# Patient Record
Sex: MALE | Race: WHITE | Employment: FULL TIME | URBAN - METROPOLITAN AREA
[De-identification: names, ages, dates, MRNs, and addresses within clinical notes are randomized per-mention and may not be internally consistent; named-entity substitution may affect disease eponyms.]

---

## 2017-03-15 ENCOUNTER — TELEPHONE (OUTPATIENT)
Dept: INTERNAL MEDICINE CLINIC | Facility: CLINIC | Age: 57
End: 2017-03-15

## 2017-03-15 RX ORDER — DEXLANSOPRAZOLE 60 MG/1
CAPSULE, DELAYED RELEASE ORAL
Qty: 90 CAPSULE | Refills: 3 | Status: SHIPPED
Start: 2017-03-15 | End: 2017-12-06

## 2017-08-29 ENCOUNTER — TELEPHONE (OUTPATIENT)
Dept: OTHER | Age: 57
End: 2017-08-29

## 2017-08-29 NOTE — TELEPHONE ENCOUNTER
Dr Gary Garcia: do you approve our office to submit a Prior Auth for patient to receive sumatriptan succinate 100mg #27. Rx is active on file. Patient has new insurance. In the past he has had PA done through a different plan.   Patient has enough for his cluster

## 2017-08-30 NOTE — TELEPHONE ENCOUNTER
PA for Sumatriptan succinate 100 mg tab completed with [facility] via CMM response time 24-72 hours KEY [id number].

## 2017-08-31 RX ORDER — SUMATRIPTAN 100 MG/1
TABLET, FILM COATED ORAL
Qty: 9 TABLET | Refills: 0 | Status: SHIPPED | OUTPATIENT
Start: 2017-08-31 | End: 2017-09-13

## 2017-08-31 NOTE — TELEPHONE ENCOUNTER
Pt's wife Tirso Doyle called to see if we can send  a short supply of Sumatriptan  Succinate #9 to local pharmacy. [facility] mail order has not filled Rx and pt needs it asap. Jose Alejandro Miller can be reached on cell [phone] or home # [phone]

## 2017-09-05 NOTE — TELEPHONE ENCOUNTER
Fax received from [facility] rx. Pa not required for this medication. Refill too soon. Contacted megan vázquez has picked up the medication.

## 2017-09-06 ENCOUNTER — TELEPHONE (OUTPATIENT)
Dept: OTHER | Age: 57
End: 2017-09-06

## 2017-09-06 NOTE — TELEPHONE ENCOUNTER
Please write a letter of medical necessity for the quantity limit and I will fax to the insurance plan. Please include diagnosis, any medications tried and failed, effective treatment response with current medication, etc..

## 2017-09-06 NOTE — TELEPHONE ENCOUNTER
Spoke to pt wife who states prior auth for increase in quantity of Sumatriptan was denies and needs to be appealed. Please call pt wife Maikol Bennett to update on status.

## 2017-09-07 NOTE — TELEPHONE ENCOUNTER
Please generate a letter stating that the patient has a history of cluster headaches. He used to take Sansert. When sumatriptan became available he found this very useful. He has been using it ever since. Is much safer than Sansert.

## 2017-09-11 NOTE — TELEPHONE ENCOUNTER
Patient's wife states she talked to [facility] this morning and they claimed they have not received the appeals letter.   Advised wife that per chart, the letter was faxed Friday afternoon to [phone], asked wife to check with [facility] to verify correct fax

## 2017-09-11 NOTE — TELEPHONE ENCOUNTER
Insurance company stating they could get 9 pills in a month but patient wants 27 pills in a month, and letter from MD office to insurance company sent on sept 8 for the appeal did not specify why they are appealing the quantity of the drug so they need a n

## 2017-09-12 ENCOUNTER — TELEPHONE (OUTPATIENT)
Dept: OTHER | Age: 57
End: 2017-09-12

## 2017-09-12 NOTE — TELEPHONE ENCOUNTER
Routing to managed care and [facility]. Patient needs appeal Letter of Medical Necessity faxed to [facility] for disp #27 of sumitriptan. See below. Non triage task.

## 2017-09-12 NOTE — TELEPHONE ENCOUNTER
Patient's wife is calling because we need a letter of medical necessity written to state why patient needs 27 pills per month of Sumatriptan. If this is not stated in the letter patient will not get the medication.  Please revise your letter stating why pat

## 2017-09-12 NOTE — TELEPHONE ENCOUNTER
Per Ricardo, [facility] care does not handle medications.

## 2017-09-12 NOTE — TELEPHONE ENCOUNTER
This message was sent to [facility] in error. We do not handles authorizations for meds nor do we do letters of medical necessity. Please reference the thread of communications.      Thank you, Ricardo

## 2017-09-13 ENCOUNTER — TELEPHONE (OUTPATIENT)
Dept: OTHER | Age: 57
End: 2017-09-13

## 2017-09-13 RX ORDER — SUMATRIPTAN 100 MG/1
TABLET, FILM COATED ORAL
Qty: 9 TABLET | Refills: 0 | Status: SHIPPED | OUTPATIENT
Start: 2017-09-13 | End: 2018-08-14

## 2017-09-13 NOTE — TELEPHONE ENCOUNTER
Clinic site staff please inform doctor triage department does not write letters of medical necessity.

## 2017-09-13 NOTE — TELEPHONE ENCOUNTER
Wife called back, she is very frustrated because the appeal for 27 tabs of sumatriptan is still outstanding. Advised her that a prescription for 9 tabs was sent to pharmacy today and will be available for  this afternoon.   She was relieved to hear

## 2017-09-13 NOTE — TELEPHONE ENCOUNTER
Spouse calling stating patient is now down to two pills and needs a new rx sent in to the [facility] for another 9 pills due to cluster headaches.

## 2017-09-13 NOTE — TELEPHONE ENCOUNTER
Called patient informed him that he could pay OOP for the medication if he wants to. Patient will let us know if they are able to use a coupon to get the rx filled.

## 2017-09-13 NOTE — TELEPHONE ENCOUNTER
Spoke with pt wife and Sumatriptan was denied since still only approved for 9 tablets. Pt wife is asking for appeal to include 27 tabs per month.     Pt is currently without medication

## 2017-09-14 NOTE — TELEPHONE ENCOUNTER
Medical necessity letter updated per Dr. Delvin Crawford. Faxed to number listed below. To Whom It May Concern,    Jessica Nolasco is a  Patient under my care. He has a history of cluster headaches for which he used to take Sansert.   When sumatriptan became availa

## 2017-09-22 NOTE — TELEPHONE ENCOUNTER
Call from [address] [place] [address] stating the appeal for the Sumatriptan has been denied due to the plan only covering up to 9 tablets per month; this is an administration policy.

## 2017-11-09 ENCOUNTER — OFFICE VISIT (OUTPATIENT)
Dept: INTERNAL MEDICINE CLINIC | Facility: CLINIC | Age: 57
End: 2017-11-09

## 2017-11-09 VITALS
HEART RATE: 85 BPM | WEIGHT: 213.38 LBS | BODY MASS INDEX: 29 KG/M2 | SYSTOLIC BLOOD PRESSURE: 115 MMHG | DIASTOLIC BLOOD PRESSURE: 78 MMHG

## 2017-11-09 DIAGNOSIS — G44.019 EPISODIC CLUSTER HEADACHE, NOT INTRACTABLE: Primary | ICD-10-CM

## 2017-11-09 PROCEDURE — 99212 OFFICE O/P EST SF 10 MIN: CPT | Performed by: INTERNAL MEDICINE

## 2017-11-09 PROCEDURE — 99213 OFFICE O/P EST LOW 20 MIN: CPT | Performed by: INTERNAL MEDICINE

## 2017-11-09 RX ORDER — SUMATRIPTAN 100 MG/1
100 TABLET, FILM COATED ORAL EVERY 2 HOUR PRN
Qty: 9 TABLET | Refills: 10 | Status: SHIPPED | OUTPATIENT
Start: 2017-11-09 | End: 2019-07-05

## 2017-11-09 RX ORDER — RIZATRIPTAN BENZOATE 10 MG/1
10 TABLET, ORALLY DISINTEGRATING ORAL AS NEEDED
Qty: 9 TABLET | Refills: 5 | Status: SHIPPED | OUTPATIENT
Start: 2017-11-09 | End: 2018-08-14 | Stop reason: ALTCHOICE

## 2017-11-09 NOTE — PROGRESS NOTES
HPI:    Patient ID: Marina Joseph is a 61year old male. Headache    This is a chronic problem. The current episode started more than 1 year ago. The problem occurs intermittently (they come in clusters). The problem has been gradually improving.  The tablet Rfl: 5   SUMAtriptan Succinate 100 MG Oral Tab TAKE 1 TABLET BY MOUTH EVERY DAY AS NEEDED Disp: 9 tablet Rfl: 0   Dexlansoprazole (DEXILANT) 60 MG Oral Capsule Delayed Release TAKE 1 CAPSULE DAILY Disp: 90 capsule Rfl: 3     Allergies:No Known Aller attest that this documentation has been prepared under the direction and in the presence of PAWAN Hadley MD.   Electronically Signed: Leda Cabrera, 11/9/2017, 4:26 PM.    IPAWAN MD,  personally performed the services described in this

## 2017-11-13 RX ORDER — SUMATRIPTAN 100 MG/1
TABLET, FILM COATED ORAL
Qty: 27 TABLET | Refills: 0 | Status: SHIPPED | OUTPATIENT
Start: 2017-11-13 | End: 2018-09-06

## 2017-11-14 ENCOUNTER — TELEPHONE (OUTPATIENT)
Dept: INTERNAL MEDICINE CLINIC | Facility: CLINIC | Age: 57
End: 2017-11-14

## 2017-11-14 NOTE — TELEPHONE ENCOUNTER
PA for Rizatriptan Benzoate 10 mg ODT completed with [facility] via CMM response time 24-72 hours KEY [id number].

## 2017-11-17 ENCOUNTER — TELEPHONE (OUTPATIENT)
Dept: INTERNAL MEDICINE CLINIC | Facility: CLINIC | Age: 57
End: 2017-11-17

## 2017-11-17 NOTE — TELEPHONE ENCOUNTER
Pt states his headaches are getting worst and is requesting methylprednisolone. pls advise. Thank you.

## 2017-11-20 ENCOUNTER — TELEPHONE (OUTPATIENT)
Dept: INTERNAL MEDICINE CLINIC | Facility: CLINIC | Age: 57
End: 2017-11-20

## 2017-11-20 DIAGNOSIS — G44.019 EPISODIC CLUSTER HEADACHE, NOT INTRACTABLE: ICD-10-CM

## 2017-11-20 RX ORDER — METHYLPREDNISOLONE 4 MG/1
TABLET ORAL
Qty: 1 KIT | Refills: 0 | Status: SHIPPED | OUTPATIENT
Start: 2017-11-20 | End: 2018-08-14

## 2017-11-20 NOTE — TELEPHONE ENCOUNTER
Signed Prescriptions Disp Refills    methylPREDNISolone (MEDROL) 4 MG Oral Tablet Therapy Pack 1 kit 0      Sig: As directed.         Authorizing Provider: Jolene Babcock

## 2017-11-20 NOTE — TELEPHONE ENCOUNTER
Patient was seen on 11/9 by Dr Corwin Rosenthal for cluster headaches, was prescribed sumatriptan but forgot to ask for a refill of steroid pack. Patient requesting refill sent to pharmacy.     Please advise    Unable to pend refill

## 2017-11-24 NOTE — TELEPHONE ENCOUNTER
Plan states no PA is required medication is covered under the pharmacy benefit plan. Rubi 89 and informed.

## 2018-08-09 ENCOUNTER — HOSPITAL ENCOUNTER (EMERGENCY)
Facility: HOSPITAL | Age: 58
Discharge: HOME | End: 2018-08-09
Attending: EMERGENCY MEDICINE
Payer: OTHER MISCELLANEOUS

## 2018-08-09 ENCOUNTER — HOSPITAL ENCOUNTER (OUTPATIENT)
Age: 58
Discharge: ANOTHER HOSPITAL | End: 2018-08-09
Attending: FAMILY MEDICINE
Payer: OTHER MISCELLANEOUS

## 2018-08-09 ENCOUNTER — APPOINTMENT (OUTPATIENT)
Dept: ULTRASOUND IMAGING | Facility: HOSPITAL | Age: 58
End: 2018-08-09
Attending: EMERGENCY MEDICINE
Payer: OTHER MISCELLANEOUS

## 2018-08-09 VITALS
WEIGHT: 210 LBS | DIASTOLIC BLOOD PRESSURE: 83 MMHG | SYSTOLIC BLOOD PRESSURE: 128 MMHG | OXYGEN SATURATION: 98 % | RESPIRATION RATE: 18 BRPM | TEMPERATURE: 98 F | BODY MASS INDEX: 28 KG/M2 | HEART RATE: 83 BPM

## 2018-08-09 VITALS
WEIGHT: 210 LBS | OXYGEN SATURATION: 94 % | HEART RATE: 84 BPM | BODY MASS INDEX: 28.44 KG/M2 | SYSTOLIC BLOOD PRESSURE: 108 MMHG | DIASTOLIC BLOOD PRESSURE: 73 MMHG | TEMPERATURE: 99 F | RESPIRATION RATE: 16 BRPM | HEIGHT: 72 IN

## 2018-08-09 DIAGNOSIS — N50.89 TESTICULAR SWELLING, LEFT: Primary | ICD-10-CM

## 2018-08-09 DIAGNOSIS — K40.90 LEFT INGUINAL HERNIA: Primary | ICD-10-CM

## 2018-08-09 PROCEDURE — 99284 EMERGENCY DEPT VISIT MOD MDM: CPT

## 2018-08-09 PROCEDURE — 93975 VASCULAR STUDY: CPT | Performed by: EMERGENCY MEDICINE

## 2018-08-09 PROCEDURE — 99213 OFFICE O/P EST LOW 20 MIN: CPT

## 2018-08-09 PROCEDURE — 76870 US EXAM SCROTUM: CPT | Performed by: EMERGENCY MEDICINE

## 2018-08-09 NOTE — ED PROVIDER NOTES
Patient Seen in: [facility] In [address] [place] [address]    History   Patient presents with:  Testicular Swelling    Stated Complaint: LLQ pain, testicle pain    HPI    Pt is a 59 yo who lifted a heavy object yesterday afternoon and has been having [phone]  ------------------------------------------------------------      MDM         Sent to ER for eval worsening left testicular pain and swelling and possible hernia.       Disposition and Plan     Clinical Impression:  Testicular swelling, left  (primary

## 2018-08-09 NOTE — ED INITIAL ASSESSMENT (HPI)
Pt states he was getting ready to unload a dock when he suddenly felt a pulling sensation in his testicles.  Pt states swelling and pain has worsened today

## 2018-08-10 NOTE — ED PROVIDER NOTES
Patient Seen in: [facility] Emergency Department    History   Patient presents with:  Eval-G (gynecologic)    Stated Complaint: sent by [facility] for [facility]    HPI    Patient is a 40-year-old male who states yesterday he was lifting a heavy object an well-nourished. HEENT:   Head: Normocephalic and atraumatic.    Right Ear: External ear normal.   Left Ear: External ear normal.   Nose: Nose normal.   Mouth/Throat: Oropharynx is clear and moist.   Eyes: Conjunctivae and EOM are normal. Pupils are equal, evaluated by general surgeon I encouraged him to use ibuprofen for discomfort.         Disposition and Plan     Clinical Impression:  Left inguinal hernia  (primary encounter diagnosis)    Disposition:  Discharge  8/9/2018  8:30 pm    Follow-up:  Eduardo Fowler

## 2018-08-14 ENCOUNTER — OFFICE VISIT (OUTPATIENT)
Dept: SURGERY | Facility: CLINIC | Age: 58
End: 2018-08-14
Payer: OTHER MISCELLANEOUS

## 2018-08-14 VITALS — BODY MASS INDEX: 28.44 KG/M2 | HEIGHT: 72 IN | WEIGHT: 210 LBS

## 2018-08-14 DIAGNOSIS — R10.32 LEFT GROIN PAIN: Primary | ICD-10-CM

## 2018-08-14 PROCEDURE — 99244 OFF/OP CNSLTJ NEW/EST MOD 40: CPT | Performed by: SURGERY

## 2018-08-14 PROCEDURE — 99212 OFFICE O/P EST SF 10 MIN: CPT | Performed by: SURGERY

## 2018-08-15 NOTE — PROGRESS NOTES
History and Physical      Porter Huston is a 60year old male. HPI   Patient presents with:  Hernia: Pt states injured left groin on Aug. 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   Pt states he went to [facility] clinic in [address] [place] [address] for initia 0.0 oz/week       Comment: Socially  Other Topics            Concern  Caffeine Concern        Yes    Comment:1 cup of coffee, soda        FAMILY HISTORY  Family History   Problem Relation Age of Onset   • Cancer Mother      breast   • Glaucoma Maternal Gra 8/14/2018  Bridget Feldman MD

## 2018-08-28 ENCOUNTER — OFFICE VISIT (OUTPATIENT)
Dept: SURGERY | Facility: CLINIC | Age: 58
End: 2018-08-28
Payer: COMMERCIAL

## 2018-08-28 DIAGNOSIS — R10.32 LEFT GROIN PAIN: Primary | ICD-10-CM

## 2018-08-28 DIAGNOSIS — T14.8XXA MUSCLE STRAIN: ICD-10-CM

## 2018-08-28 PROCEDURE — 99212 OFFICE O/P EST SF 10 MIN: CPT | Performed by: SURGERY

## 2018-08-28 PROCEDURE — 99213 OFFICE O/P EST LOW 20 MIN: CPT | Performed by: SURGERY

## 2018-08-28 NOTE — PROGRESS NOTES
Established Patient Follow-up      8/28/2018    Michelle Wolf 60year old      HPI  Patient presents with:  Groin Pain: Pt here for check up left groin pain. Pt states pain has decreased but .   Pt states motrin helps with back pain but not r

## 2018-09-06 ENCOUNTER — OFFICE VISIT (OUTPATIENT)
Dept: INTERNAL MEDICINE CLINIC | Facility: CLINIC | Age: 58
End: 2018-09-06
Payer: COMMERCIAL

## 2018-09-06 VITALS
HEART RATE: 73 BPM | SYSTOLIC BLOOD PRESSURE: 122 MMHG | DIASTOLIC BLOOD PRESSURE: 81 MMHG | BODY MASS INDEX: 29 KG/M2 | TEMPERATURE: 98 F | WEIGHT: 214.81 LBS

## 2018-09-06 DIAGNOSIS — M54.50 ACUTE BILATERAL LOW BACK PAIN WITHOUT SCIATICA: Primary | ICD-10-CM

## 2018-09-06 LAB
APPEARANCE: YELLOW
MULTISTIX LOT#: NORMAL NUMERIC
PH, URINE: 6 (ref 4.5–8)
SPECIFIC GRAVITY: 1 (ref 1–1.03)
URINE-COLOR: CLEAR
UROBILINOGEN,SEMI-QN: 0.2 MG/DL (ref 0–1.9)

## 2018-09-06 PROCEDURE — 81003 URINALYSIS AUTO W/O SCOPE: CPT | Performed by: INTERNAL MEDICINE

## 2018-09-06 PROCEDURE — 99213 OFFICE O/P EST LOW 20 MIN: CPT | Performed by: INTERNAL MEDICINE

## 2018-09-06 RX ORDER — PANTOPRAZOLE SODIUM 40 MG/1
40 TABLET, DELAYED RELEASE ORAL 2 TIMES DAILY
Qty: 180 TABLET | Refills: 3 | Status: SHIPPED | OUTPATIENT
Start: 2018-09-06 | End: 2019-09-01

## 2018-09-06 RX ORDER — PANTOPRAZOLE SODIUM 40 MG/1
TABLET, DELAYED RELEASE ORAL
COMMUNITY
Start: 2018-08-29 | End: 2019-07-17

## 2018-09-06 NOTE — PROGRESS NOTES
HPI:    Patient ID: Isaias Choi is a 60year old male. Sinusitis   This is a new problem. The current episode started in the past 7 days. The problem is unchanged. There has been no fever.  Pertinent negatives include no chills, congestion, ear pain, Right  2003:  INGUINAL HERNIA REPAIR Left  No date: VASECTOMY  2005: YAG CAPSULOTOMY - OU - BOTH EYES   Family History   Problem Relation Age of Onset   • Cancer Mother      breast   • Glaucoma Maternal Grandfather    • Diabetes Maternal Grandmother    • Ca (primary encounter diagnosis)     (M54.5) Acute bilateral low back pain without sciatica  (primary encounter diagnosis)  Plan: Pt presents to clinic today for bilateral lower back pain which is worse with lying down.  Pt takes ibuprofen and drinks lemonade

## 2018-09-11 ENCOUNTER — OFFICE VISIT (OUTPATIENT)
Dept: SURGERY | Facility: CLINIC | Age: 58
End: 2018-09-11
Payer: OTHER MISCELLANEOUS

## 2018-09-11 DIAGNOSIS — R10.32 LEFT GROIN PAIN: Primary | ICD-10-CM

## 2018-09-11 PROCEDURE — 99212 OFFICE O/P EST SF 10 MIN: CPT | Performed by: SURGERY

## 2018-09-11 PROCEDURE — 99213 OFFICE O/P EST LOW 20 MIN: CPT | Performed by: SURGERY

## 2018-09-12 ENCOUNTER — APPOINTMENT (OUTPATIENT)
Dept: OCCUPATIONAL MEDICINE | Age: 58
End: 2018-09-12
Attending: ORTHOPAEDIC SURGERY

## 2018-09-12 NOTE — PROGRESS NOTES
Established Patient Follow-up      9/11/2018    Marina Joseph 60year old      HPI  Patient presents with: Follow - Up: Patient here for check up left groin pain.  Patient states the pain has improved, but went to PCP's office on 09/16/2018 due to severe

## 2019-01-04 NOTE — TELEPHONE ENCOUNTER
Refill passed per [facility], [facility] protocol.   Refill Protocol Appointment Criteria  · Appointment scheduled in the past 12 months or in the next 3 months  Recent Outpatient Visits            3 months ago Left groin pain    [facility]

## 2019-04-04 NOTE — TELEPHONE ENCOUNTER
Current Outpatient Medications:  DEXILANT 60 MG Oral Capsule Delayed Release TAKE 1 CAPSULE BY MOUTH  DAILY Disp: 90 capsule Rfl: 0

## 2019-04-05 RX ORDER — DEXLANSOPRAZOLE 60 MG/1
CAPSULE, DELAYED RELEASE ORAL
Qty: 90 CAPSULE | Refills: 0 | Status: SHIPPED | OUTPATIENT
Start: 2019-04-05 | End: 2019-09-05

## 2019-04-05 NOTE — TELEPHONE ENCOUNTER
Refill passed per [facility], [facility] protocol.   Refill Protocol Appointment Criteria  · Appointment scheduled in the past 12 months or in the next 3 months  Recent Outpatient Visits            6 months ago Left groin pain    [facility]

## 2019-06-26 RX ORDER — SUMATRIPTAN 100 MG/1
100 TABLET, FILM COATED ORAL EVERY 2 HOUR PRN
Qty: 9 TABLET | Refills: 10 | OUTPATIENT
Start: 2019-06-26

## 2019-06-26 RX ORDER — METHYLPREDNISOLONE 4 MG/1
TABLET ORAL
Qty: 1 KIT | Refills: 0 | OUTPATIENT
Start: 2019-06-26

## 2019-06-26 NOTE — TELEPHONE ENCOUNTER
Patient requesting refill for     SUMAtriptan Succinate (IMITREX) 100 MG Oral Tab Take 1 tablet (100 mg total) by mouth every 2 (two) hours as needed for Migraine.  Disp: 9 tablet Rfl: 10       MethylPREDNISolone 4 MG Oral Kit     Per patient this is given

## 2019-06-26 NOTE — TELEPHONE ENCOUNTER
Please advise, Pt stated he have had cluster migraine h/a x 1 week, getting worse each day -, Hx -9 years , tried [employer], out o medication ,haven't need it for a while, no n/v but has light sensitivity, requesting rx

## 2019-07-05 RX ORDER — SUMATRIPTAN 100 MG/1
100 TABLET, FILM COATED ORAL EVERY 2 HOUR PRN
Qty: 9 TABLET | Refills: 0 | Status: SHIPPED | OUTPATIENT
Start: 2019-07-05 | End: 2019-07-17

## 2019-07-05 NOTE — TELEPHONE ENCOUNTER
Kassie from [place] calling for refill     SUMAtriptan Succinate (IMITREX) 100 MG Oral Tab    Pt is out of medicine

## 2019-07-05 NOTE — TELEPHONE ENCOUNTER
Patient failed protocol. Script pended. Please advise.     Refill Protocol Appointment Criteria  · Appointment scheduled in the past 6 months or in the next 3 months  Recent Outpatient Visits            9 months ago Left groin pain    [facility]

## 2019-07-17 ENCOUNTER — OFFICE VISIT (OUTPATIENT)
Dept: INTERNAL MEDICINE CLINIC | Facility: CLINIC | Age: 59
End: 2019-07-17
Payer: COMMERCIAL

## 2019-07-17 VITALS
RESPIRATION RATE: 20 BRPM | DIASTOLIC BLOOD PRESSURE: 88 MMHG | SYSTOLIC BLOOD PRESSURE: 122 MMHG | HEIGHT: 72 IN | TEMPERATURE: 98 F | HEART RATE: 100 BPM | BODY MASS INDEX: 30.48 KG/M2 | WEIGHT: 225 LBS

## 2019-07-17 DIAGNOSIS — G44.011 INTRACTABLE EPISODIC CLUSTER HEADACHE: Primary | ICD-10-CM

## 2019-07-17 PROCEDURE — 99212 OFFICE O/P EST SF 10 MIN: CPT | Performed by: INTERNAL MEDICINE

## 2019-07-17 PROCEDURE — 99214 OFFICE O/P EST MOD 30 MIN: CPT | Performed by: INTERNAL MEDICINE

## 2019-07-17 RX ORDER — PREDNISONE 10 MG/1
TABLET ORAL
Qty: 60 TABLET | Refills: 0 | Status: SHIPPED | OUTPATIENT
Start: 2019-07-17 | End: 2019-11-09

## 2019-07-17 RX ORDER — SUMATRIPTAN 100 MG/1
100 TABLET, FILM COATED ORAL EVERY 2 HOUR PRN
Qty: 9 TABLET | Refills: 2 | Status: SHIPPED | OUTPATIENT
Start: 2019-07-17 | End: 2019-11-09

## 2019-07-17 NOTE — PROGRESS NOTES
HPI:    Patient ID: Leonie Voss is a 60year old male. Chief Complaint: Follow - Up (Cluster headaches)      Headache    This is a chronic (43 year history of cluster headaches) problem. The current episode started 1 to 4 weeks ago.  The problem occurs His treatment is limited by the dosing of sumatriptan. He does get relief but rations the tablets because he doesn't have enough to last and is worried if the headache doesn't go away.  Typically responds to 50mg for 6-8 hours, sometimes goes away, sometime Dexlansoprazole (DEXILANT) 60 MG Oral Capsule Delayed Release TAKE 1 CAPSULE BY MOUTH  DAILY Disp: 90 capsule Rfl: 0   IBUPROFEN OR Take by mouth.  Disp:  Rfl:    Pantoprazole Sodium 40 MG Oral Tab EC Take 1 tablet (40 mg total) by mouth 2 (two) times daily -     SUMAtriptan Succinate 100 MG Oral Tab; Take 1 tablet (100 mg total) by mouth every 2 (two) hours as needed for Migraine. Limit to 200mg in 24 hours  -     predniSONE 10 MG Oral Tab;  Take 30mg daily for 5 days when your cluster headaches start, then 2 Preventive measures discussed and further education provided to patient in after visit summary. Potential medication side effects discussed. All questions answered. Patient understands and agrees to follow directions and advice.  Patient asked to sign up f The pain is around a single eye. You may have tears coming from that eye. That eyelid may become droopy. The eye may be red and swollen. You may also have a stuffy or runny nose on the affected side.   You may also have several headaches in one 24-hour vincent · Don’t drive yourself home if you were given pain medicine for your headache. Instead, have someone else drive you home. Try to sleep when you get home. You should feel much better when you wake up.   · If your healthcare provider gave you preventive medic · Weakness in an arm or leg, or on one side of your face  · Difficulty speaking  · Headaches brought on by physical activity  · Changes in your vision  · You need to use more pain medicine than normal  Date Last Reviewed: 8/1/2016  © 8408-7590 The [employer] Keeping a headache diary can help you and your healthcare provider identify what's causing your headaches:  · Note when each headache happens. · Identify your activities and the foods you've eaten 6 to 8 hours before the headache began.   · Look for any tr Migraines and cluster headaches cause intense, throbbing pain on one side of the head. With a migraine, you may have nausea and vomiting and be sensitive to light and sound.  You may also have warning signs, such as flashing lights or loss of parts of your Date Last Reviewed: 12/1/2017  © 5481-5388 The [employer] [address]. [address] [facility], [address] [place]. All rights reserved. This information is not intended as a substitute for professional medical care.  Always follow your healthcare professional

## 2019-07-17 NOTE — PATIENT INSTRUCTIONS
Cluster Headache  A cluster headache is different from a migraine or a tension headache. A cluster headache starts suddenly, without any warning. The pain can become severe within minutes. The headache is often brief. It can last only 15 minutes.  But it Preventive medicines include steroids and anti-seizure medicines. These can help cut the number of headaches you have during a cluster period. High dose oxygen therapy or a nerve stimulator may shorten each attack and make it less severe.  If you have clust · Talk with a counselor or therapist, or join a support group. This may help you cope with the effects of cluster headache on your life.   Follow-up care  Follow up with your healthcare provider, or as advised If you had a CT scan or an MRI, a specialist wi · Massage tight neck, shoulder, and head muscles. · To relax muscles, soak in a hot bath or use a hot shower. Use medicines  Aspirin or other over-the-counter pain medicines, such as ibuprofen and acetaminophen, can relieve headache.  Remember: Never give · Seek immediate medical attention if you have a headache that you would call \"the worst headache you have ever had. \"  Date Last Reviewed: 3/1/2018  © 1922-7050 [employer]. [address] [facility], [address] [place] [address]. All rights reserved.  [place] · Stay out of the light. Wear dark glasses, turn out lights, and close the curtains. When outdoors, wear a brimmed hat. · Drink lots of fluids. Sip caffeine-free flat soda to help relieve nausea.   · See your doctor if you get migraines or cluster headache

## 2019-08-31 NOTE — TELEPHONE ENCOUNTER
Pt requesting refill for following medication.  Please advise      •  Dexlansoprazole (DEXILANT) 60 MG Oral Capsule Delayed Release, TAKE 1 CAPSULE BY MOUTH  DAILY, Disp: 90 capsule, Rfl: 0

## 2019-09-02 NOTE — TELEPHONE ENCOUNTER
Please advise in regards to refill request. Thank You  Refill Protocol Appointment Criteria  · Appointment scheduled in the past 12 months or in the next 3 months  Recent Outpatient Visits            1 month ago Intractable episodic cluster headache    [place]

## 2019-09-04 RX ORDER — DEXLANSOPRAZOLE 60 MG/1
CAPSULE, DELAYED RELEASE ORAL
Qty: 90 CAPSULE | Refills: 1 | OUTPATIENT
Start: 2019-09-04

## 2019-09-05 RX ORDER — DEXLANSOPRAZOLE 60 MG/1
CAPSULE, DELAYED RELEASE ORAL
Qty: 90 CAPSULE | Refills: 0 | Status: SHIPPED | OUTPATIENT
Start: 2019-09-05 | End: 2019-09-07

## 2019-09-07 RX ORDER — DEXLANSOPRAZOLE 60 MG/1
60 CAPSULE, DELAYED RELEASE ORAL DAILY
Qty: 90 CAPSULE | Refills: 1 | Status: SHIPPED | OUTPATIENT
Start: 2019-09-07 | End: 2020-02-19

## 2019-09-07 NOTE — TELEPHONE ENCOUNTER
Spouse on hipaa states patient is still on the dexilant and needs refills to [facility]. Rx pended for sign off.

## 2019-09-07 NOTE — TELEPHONE ENCOUNTER
If pt requested please pend and ok to refill. If not, then please check with pt if he still needs this.

## 2019-09-11 NOTE — TELEPHONE ENCOUNTER
Pt called tried to transfer to [place]/RN but before can transfer to triage phone dropped. Pt is asking why he needs to come and see dr in 3 month? Pls advise.

## 2019-09-11 NOTE — TELEPHONE ENCOUNTER
*3rd attempt* LMTCB to schedule f/u appt on [phone] no answer/ [facility]

## 2019-11-09 ENCOUNTER — OFFICE VISIT (OUTPATIENT)
Dept: INTERNAL MEDICINE CLINIC | Facility: CLINIC | Age: 59
End: 2019-11-09
Payer: COMMERCIAL

## 2019-11-09 VITALS
HEIGHT: 72 IN | DIASTOLIC BLOOD PRESSURE: 82 MMHG | SYSTOLIC BLOOD PRESSURE: 125 MMHG | TEMPERATURE: 98 F | HEART RATE: 78 BPM | WEIGHT: 225.81 LBS | BODY MASS INDEX: 30.59 KG/M2

## 2019-11-09 DIAGNOSIS — G44.011 INTRACTABLE EPISODIC CLUSTER HEADACHE: ICD-10-CM

## 2019-11-09 DIAGNOSIS — Z13.6 SCREENING, ISCHEMIC HEART DISEASE: ICD-10-CM

## 2019-11-09 DIAGNOSIS — K63.5 POLYP OF COLON, UNSPECIFIED PART OF COLON, UNSPECIFIED TYPE: ICD-10-CM

## 2019-11-09 DIAGNOSIS — Z12.11 COLON CANCER SCREENING: ICD-10-CM

## 2019-11-09 DIAGNOSIS — R68.82 DIMINISHED LIBIDO: ICD-10-CM

## 2019-11-09 DIAGNOSIS — Z00.00 ANNUAL PHYSICAL EXAM: Primary | ICD-10-CM

## 2019-11-09 PROCEDURE — 99396 PREV VISIT EST AGE 40-64: CPT | Performed by: INTERNAL MEDICINE

## 2019-11-09 RX ORDER — SUMATRIPTAN 100 MG/1
100 TABLET, FILM COATED ORAL EVERY 2 HOUR PRN
Qty: 9 TABLET | Refills: 2 | Status: SHIPPED | OUTPATIENT
Start: 2019-11-09 | End: 2020-04-01

## 2019-11-09 RX ORDER — PREDNISONE 10 MG/1
TABLET ORAL
Qty: 60 TABLET | Refills: 0 | Status: SHIPPED | OUTPATIENT
Start: 2019-11-09 | End: 2020-11-16

## 2019-11-09 NOTE — PATIENT INSTRUCTIONS
Prevention Guidelines, Men Ages 48 to 59  Screening tests and vaccines are an important part of managing your health. A screening test is done to find possible disorders or diseases in people who don't have any symptoms.  The goal is to find a disease ear High cholesterol or triglycerides All men in this age group At least every 5 years   HIV Men at increased risk for infection – talk with your healthcare provider At routine exams   Lung cancer Adults age 54 to [de-identified] who have smoked Yearly screening in smokers Pneumococcal conjugate vaccine (PCV13) and pneumococcal polysaccharide vaccine (PPSV23) Men at increased risk for infection – talk with your healthcare provider PCV13: 1 dose ages 23 to 72 (protects against 13 types of pneumococcal bacteria)     PPSV23: 1 Women's ovaries also make small amounts of testosterone. It helps many organs and body processes in women.   The pituitary gland in your brain controls the amount of testosterone your body makes.   Most of the testosterone in your blood attaches to 2 protei · Erectile dysfunction or inability to have an orgasm  · Infertility  Men with HIV/AIDS may also have low testosterone levels.   Signs and symptoms of high testosterone in women include:  · Irregular or no menstruation  · Extra hair growth, especially on t Results of this test are given in picograms per milliliter (pg/mL). Your level of free testosterone is normal if it is 0.3 to 2 pg/mL, or 0.1 to 0.3 percent of your total testosterone levels.   How is this test done? The test is done with a blood sample.

## 2019-11-09 NOTE — PROGRESS NOTES
HPI:    Patient ID: Leonie Voss is a 60year old male.   Chief Complaint: Physical (needs meds refilled. )      HPI    Pleasant 13-year-old gentleman who presents for annual physical exam.  He has a history of cluster headaches, see prior note for full •  Dexlansoprazole (DEXILANT) 60 MG Oral Capsule Delayed Release, Take 60 mg by mouth daily. , Disp: 90 capsule, Rfl: 1  •  IBUPROFEN OR, Take by mouth., Disp: , Rfl:      Reviewed:  Patient Active Problem List    Polyp of colon         Date Noted: 11/09/20 Respiratory: Negative for cough, shortness of breath and wheezing. Cardiovascular: Negative for chest pain, palpitations and leg swelling. Gastrointestinal: Negative for abdominal pain, blood in stool and anal bleeding.    Endocrine: Negative for polyd Neurological: He is alert and oriented to person, place, and time. He has normal strength. No cranial nerve deficit, sensory deficit or motor deficit. Skin: Skin is warm and dry. Psychiatric: He has a normal mood and affect.  His behavior is normal. His headaches are well controlled on sumatriptan and prednisone as needed as above, he is well versed in how to take these medications from the [facility], he still has some prednisone left over, he tolerates these medications well and without any s Screening tests and vaccines are an important part of managing your health. A screening test is done to find possible disorders or diseases in people who don't have any symptoms.  The goal is to find a disease early so lifestyle changes can be made and you High cholesterol or triglycerides All men in this age group At least every 5 years   HIV Men at increased risk for infection – talk with your healthcare provider At routine exams   Lung cancer Adults age 54 to [de-identified] who have smoked Yearly screening in smokers Pneumococcal conjugate vaccine (PCV13) and pneumococcal polysaccharide vaccine (PPSV23) Men at increased risk for infection – talk with your healthcare provider PCV13: 1 dose ages 23 to 72 (protects against 13 types of pneumococcal bacteria)     PPSV23: 1 Women's ovaries also make small amounts of testosterone. It helps many organs and body processes in women.   The pituitary gland in your brain controls the amount of testosterone your body makes.   Most of the testosterone in your blood attaches to 2 protei · Erectile dysfunction or inability to have an orgasm  · Infertility  Men with HIV/AIDS may also have low testosterone levels.   Signs and symptoms of high testosterone in women include:  · Irregular or no menstruation  · Extra hair growth, especially on t Results of this test are given in picograms per milliliter (pg/mL). Your level of free testosterone is normal if it is 0.3 to 2 pg/mL, or 0.1 to 0.3 percent of your total testosterone levels.   How is this test done? The test is done with a blood sample.

## 2019-11-16 ENCOUNTER — APPOINTMENT (OUTPATIENT)
Dept: LAB | Age: 59
End: 2019-11-16
Attending: INTERNAL MEDICINE
Payer: COMMERCIAL

## 2019-11-16 DIAGNOSIS — Z00.00 ANNUAL PHYSICAL EXAM: ICD-10-CM

## 2019-11-16 PROCEDURE — 84443 ASSAY THYROID STIM HORMONE: CPT | Performed by: INTERNAL MEDICINE

## 2019-11-16 PROCEDURE — 36415 COLL VENOUS BLD VENIPUNCTURE: CPT | Performed by: INTERNAL MEDICINE

## 2019-11-16 PROCEDURE — 80061 LIPID PANEL: CPT | Performed by: INTERNAL MEDICINE

## 2019-11-16 PROCEDURE — 85027 COMPLETE CBC AUTOMATED: CPT | Performed by: INTERNAL MEDICINE

## 2019-11-16 PROCEDURE — 80053 COMPREHEN METABOLIC PANEL: CPT | Performed by: INTERNAL MEDICINE

## 2019-11-16 PROCEDURE — 83036 HEMOGLOBIN GLYCOSYLATED A1C: CPT | Performed by: INTERNAL MEDICINE

## 2019-11-21 ENCOUNTER — TELEPHONE (OUTPATIENT)
Dept: INTERNAL MEDICINE CLINIC | Facility: CLINIC | Age: 59
End: 2019-11-21

## 2019-11-21 DIAGNOSIS — R74.8 INCREASED LIVER ENZYMES: Primary | ICD-10-CM

## 2019-11-21 DIAGNOSIS — E78.00 HIGH CHOLESTEROL: ICD-10-CM

## 2019-11-21 NOTE — TELEPHONE ENCOUNTER
Josey (wife-RAFAEL) calling back, states that she received the information that was left by Dunia Hill (RN) , states that patient is doing diet and exercise , there is an order to do liver enzymes in 3 months, asking if PCP can order the lipid panel at the same time

## 2019-11-21 NOTE — TELEPHONE ENCOUNTER
RN see note below. Notes recorded by Caroline Sheikh MD on 11/19/2019 at 8:58 AM CST  Lipid panel looks good. The patient's ASCVD 10 year risk score is 8.9, he would benefit from a low dose statin such as rosuvastatin 5mg to help further lower cholestero

## 2019-11-25 DIAGNOSIS — R74.8 ELEVATED LIVER ENZYMES: Primary | ICD-10-CM

## 2019-11-25 NOTE — TELEPHONE ENCOUNTER
Noted. That's fine if he doesn't want a statin. Both cmp and lipid panel for repeat in 3 months already ordered.

## 2019-11-25 NOTE — TELEPHONE ENCOUNTER
Spoke with the patient and instructed him on Dr. Cunha Read orders and message from below. Patient voiced understanding and agreed with the plan of care.

## 2019-12-17 ENCOUNTER — OFFICE VISIT (OUTPATIENT)
Dept: INTERNAL MEDICINE CLINIC | Facility: CLINIC | Age: 59
End: 2019-12-17
Payer: COMMERCIAL

## 2019-12-17 VITALS
BODY MASS INDEX: 31 KG/M2 | HEART RATE: 85 BPM | DIASTOLIC BLOOD PRESSURE: 80 MMHG | SYSTOLIC BLOOD PRESSURE: 119 MMHG | WEIGHT: 225 LBS | TEMPERATURE: 98 F

## 2019-12-17 DIAGNOSIS — Z13.6 SCREENING FOR ISCHEMIC HEART DISEASE: ICD-10-CM

## 2019-12-17 DIAGNOSIS — N52.8 OTHER MALE ERECTILE DYSFUNCTION: ICD-10-CM

## 2019-12-17 DIAGNOSIS — E78.5 DYSLIPIDEMIA: Primary | ICD-10-CM

## 2019-12-17 PROCEDURE — 99214 OFFICE O/P EST MOD 30 MIN: CPT | Performed by: INTERNAL MEDICINE

## 2019-12-17 PROCEDURE — 99212 OFFICE O/P EST SF 10 MIN: CPT | Performed by: INTERNAL MEDICINE

## 2019-12-17 RX ORDER — SILDENAFIL 100 MG/1
100 TABLET, FILM COATED ORAL AS NEEDED
Qty: 30 TABLET | Refills: 1 | Status: SHIPPED | OUTPATIENT
Start: 2019-12-17 | End: 2020-08-03

## 2019-12-17 RX ORDER — ROSUVASTATIN CALCIUM 5 MG/1
5 TABLET, COATED ORAL NIGHTLY
Qty: 90 TABLET | Refills: 1 | Status: SHIPPED | OUTPATIENT
Start: 2019-12-17 | End: 2020-11-16

## 2019-12-17 NOTE — PATIENT INSTRUCTIONS
Purchase BAG BALM and cotton gloves as well as CeraVe CREAM, not the lotion which is very thin.      Medicine for Cholesterol Control  Cholesterol is a waxy substance in your bloodstream. If there is too much of it in your blood, it can build up in the wall taking a new medicine. Side effects can include headache and upset stomach. Rarely you can have muscle aches. Tell your healthcare provider about any side effects you have.   When to call your healthcare provider  When taking your medicine, let your [facility] Reviewed: 10/1/2016  © 3973-0203 [employer] [address]. 1407 [facility] [place], [facility] [place]. All rights reserved. This information is not intended as a substitute for professional medical care.  Always follow your healthcare professional's instruc to treat high blood pressure? · Do you smoke? · Do you have diabetes? · Do you exercise very little or not very often? Recommendations are for 30 minutes of exercise at least 5 days a week.  If you are not doing cardiovascular exercise as often as these level tested more often to make sure your medicine and lifestyle changes are working to reduce your risks of having a heart attack or stroke. Date Last Reviewed: 6/1/2016  © 5927-5723 [employer]. 1407 [facility], [address] [place] [address]. unsaturated fats  Unsaturated fats are usually liquid at room temperature. They are better choices for your heart than saturated fat. There are two types of unsaturated fats: polyunsaturated fat and monounsaturated fat.  Aim to replace saturated fats with p grams (g) of fat are in 1 serving. · Calories from Fat. This tells you the total number of calories from fat in 1 serving (there are 9 calories per gram of fat). Look for foods with the fewest calories from fat.   · Saturated Fat. Tells you how many grams health history. Mention all medicines you take. This includes over-the-counter drugs, supplements, and herbs. · Ask your doctor about whether you can drink alcohol while taking ED medication.   Possible side effects of oral ED medicines  · Headache  · Faci follow your healthcare professional's instructions. Understanding Erectile Dysfunction    Erectile dysfunction (ED) is a problem getting an erection firm enough or keeping it long enough for intercourse. The problem can happen to any man at any age. with your sex life. Learn to talk with, and listen to, your partner. And remember that your value as a man goes beyond what you do in bed. Tips for intimacy  As you and your partner become closer to each other, you might find that you can enjoy sex more. for cardiovascular disease. Eating healthy  Your healthcare provider will give you information on diet changes you may need to make. Your provider may recommend that you see a registered dietitian for help with diet changes.  Changes may include:  · Cutt more physical activity can help. Changing your diet will help you lose weight more easily than adding exercise. Quitting smoking  Smoking and other tobacco use can raise cholesterol and make it harder to control. Quitting is tough.  But millions of people take medicines called statins to control their cholesterol. This is in addition to eating a healthy diet and getting regular exercise. Statins can help you stay healthy. They can also help prevent a heart attack or stroke.  You may need to take a statin if

## 2019-12-17 NOTE — PROGRESS NOTES
History of Present Illness   Patient ID: Donna Wilkerson is a 60year old male. Chief Complaint: Lab Results (discuss chol, would like to try diet and avoid meds. )      Hyperlipidemia   This is a new problem. This is a new diagnosis.  The problem is uncon Genitourinary: Positive for decreased libido and sexual dysfunction. Negative for dysuria, urgency, frequency, hematuria, decreased urine volume, discharge, penile swelling, scrotal swelling, difficulty urinating, penile pain and nocturia.    Psychiatric/Be  11/16/2019    A1C 5.4 11/16/2019     Lab Results   Component Value Date    WBC 6.7 11/16/2019    RBC 5.13 11/16/2019    HGB 16.6 11/16/2019    HCT 49.5 11/16/2019    MCV 96.5 11/16/2019    MCH 32.4 11/16/2019    MCHC 33.5 11/16/2019    RDW 11.9 11 • YAG CAPSULOTOMY - OU - BOTH EYES  2005      Reviewed:  Social History    Tobacco Use      Smoking status: Former Smoker        Packs/day: 0.00      Smokeless tobacco: Former User      Tobacco comment: quit 40 years ago    Alcohol use:  Yes      Alcohol/we - Sildenafil Citrate (VIAGRA) 100 MG Oral Tab; Take 1 tablet (100 mg total) by mouth as needed for Erectile Dysfunction. Dispense: 30 tablet; Refill: 1  3. Screening for ischemic heart disease  - CT CALCIUM SCORING; Future  Plan  New problem.   Will screen Patient Instructions     Purchase BAG BALM and cotton gloves as well as CeraVe CREAM, not the lotion which is very thin.      Medicine for Cholesterol Control  Cholesterol is a waxy substance in your bloodstream. If there is too much of it in your blood, it Medicines can cause side effects. These often occur at the start of taking a new medicine. Side effects can include headache and upset stomach. Rarely you can have muscle aches. Tell your healthcare provider about any side effects you have.   When to call y Make a plan to have regular cholesterol checks. You may need to fast before getting this test. Also ask your provider about any side effects your medicines may cause. Let your provider know about any side effects you have.  You may need to take more than on How your cholesterol numbers affect your heart health depends on other risk factors for heart attack and stroke. Check off each risk factor below that applies to you:  · Are you a man 39years old or older or a woman 54years old or older?   · Does your fam HDL cholesterol:                           LDL cholesterol:                         Total cholesterol:                         Triglyceride:                           Step 3.  Discuss the results with your healthcare provider   If your cholesterol levels ar Saturated fats come from animals and certain plants (such as coconut and palm). Eating too much saturated fat can raise your blood cholesterol levels and make your artery problems worse. Your goal is to eat less saturated fat.  Below are some examples of fo Triglycerides are a type of fat in your blood. Like cholesterol, high levels of triglycerides can lead to blocked arteries.  High triglyceride levels can be reduced 20% to 50%  by limiting added sugars in your diet, susbstituting healthier fats for saturate There are three types of prescription oral ED medicines. Each one increases blood flow to the penis. When the penis is stimulated, an erection results.  The types are:  · Sildenafil citrate   · Tadalafil   · Vardenafil  · Avanfil  What oral ED medicines don · If you’ve had a heart attack or have heart disease and you have not had sex for a while, talk to your doctor. Having sex again can put extra strain on your heart. Your doctor can confirm that your heart is healthy enough for sex.   · It is rare, but some Prescription medications for ED are available. They help many men who try them. Depending upon the cause of the ED, though, medications may not be enough. In these cases, other treatment options are available. These include erectile aids and surgery.  Your It’s OK to be shy when you talk about sex with your partner. But talking gets easier with practice. Use these tips when you talk with each other. · Choose a time and place when you’re both relaxed and comfortable. · Listen to your partner.  Try repeating · Limiting how many sugar-sweetened beverages you drink  · Limiting how often you eat out  Getting exercise  Regular exercise is a good way to help your body control cholesterol. Regular exercise can help in many ways.  It can:  · Raise your good cholestero · Stains your teeth  · Makes your skin, clothes, and breath smell bad  · Costs a lot of money  Controlling stress   Learn ways to control stress. This will help you deal with stress in your home and work life.  Controlling stress can greatly lower your risk · Adults who have diabetes. Or adults who are at higher risk of having a heart attack or stroke and have an LDL cholesterol level of 70 to 189 mg/dL  · Adults who are 24years old or older and have an LDL cholesterol level of 190 mg/dL or higher.   If you a

## 2019-12-18 PROBLEM — E78.5 DYSLIPIDEMIA: Status: ACTIVE | Noted: 2019-12-18

## 2019-12-18 PROBLEM — N52.8 OTHER MALE ERECTILE DYSFUNCTION: Status: ACTIVE | Noted: 2019-12-18

## 2020-02-19 RX ORDER — DEXLANSOPRAZOLE 60 MG/1
CAPSULE, DELAYED RELEASE ORAL
Qty: 90 CAPSULE | Refills: 1 | Status: SHIPPED | OUTPATIENT
Start: 2020-02-19 | End: 2020-08-28

## 2020-04-01 ENCOUNTER — PATIENT MESSAGE (OUTPATIENT)
Dept: INTERNAL MEDICINE CLINIC | Facility: CLINIC | Age: 60
End: 2020-04-01

## 2020-04-01 DIAGNOSIS — G44.011 INTRACTABLE EPISODIC CLUSTER HEADACHE: ICD-10-CM

## 2020-04-01 RX ORDER — SUMATRIPTAN 100 MG/1
100 TABLET, FILM COATED ORAL EVERY 2 HOUR PRN
Qty: 9 TABLET | Refills: 2 | Status: SHIPPED | OUTPATIENT
Start: 2020-04-01 | End: 2020-11-07

## 2020-04-02 NOTE — TELEPHONE ENCOUNTER
From: Gee Clint  To: Juana Lay MD  Sent: 4/1/2020 2:15 PM CDT  Subject: Lars Garcia Dr  Can you please send a prescription to [facility] for sumatriptan 100mg tabs. I started a cluster need several refills.    Thank you   We are

## 2020-05-26 ENCOUNTER — HOSPITAL ENCOUNTER (OUTPATIENT)
Dept: CT IMAGING | Age: 60
Discharge: HOME | End: 2020-05-26
Attending: INTERNAL MEDICINE

## 2020-05-26 DIAGNOSIS — Z13.6 SCREENING, ISCHEMIC HEART DISEASE: ICD-10-CM

## 2020-05-26 DIAGNOSIS — Z00.00 ANNUAL PHYSICAL EXAM: ICD-10-CM

## 2020-05-27 ENCOUNTER — TELEPHONE (OUTPATIENT)
Dept: INTERNAL MEDICINE CLINIC | Facility: CLINIC | Age: 60
End: 2020-05-27

## 2020-05-27 NOTE — TELEPHONE ENCOUNTER
Pt called back. Advised of dr's recommendation to have video visit with recommendation to start  A statin therapy. Pt flatly refused any discussion of starting such a medication. \"I will not take any medication. \"  Do you still want the videovisit?

## 2020-05-27 NOTE — TELEPHONE ENCOUNTER
Noted. If he doesn't want to take any medications that is his right, I do not recommend it but can not force him. If he is ok with a video/telephone visit then please schedule, I can discuss the importance of this with him.

## 2020-08-03 ENCOUNTER — PATIENT MESSAGE (OUTPATIENT)
Dept: INTERNAL MEDICINE CLINIC | Facility: CLINIC | Age: 60
End: 2020-08-03

## 2020-08-03 DIAGNOSIS — N52.8 OTHER MALE ERECTILE DYSFUNCTION: ICD-10-CM

## 2020-08-03 RX ORDER — SILDENAFIL 100 MG/1
100 TABLET, FILM COATED ORAL AS NEEDED
Qty: 30 TABLET | Refills: 1 | Status: SHIPPED | OUTPATIENT
Start: 2020-08-03 | End: 2020-08-03

## 2020-08-03 NOTE — TELEPHONE ENCOUNTER
Routed to Dr. Veena Cruz for advise, thanks. rx pended.       Requested Prescriptions     Pending Prescriptions Disp Refills   • Sildenafil Citrate (VIAGRA) 100 MG Oral Tab 30 tablet 1     Sig: Take 1 tablet (100 mg total) by mouth as needed for Erectile Dysf

## 2020-08-03 NOTE — TELEPHONE ENCOUNTER
From: Tsosie Hodgkins  To: Srinivas Mora MD  Sent: 8/3/2020 3:44 PM CDT  Subject: Prescription Question    Hello I called and asked for refill of seldenafil citrate I asked for a written script that I told them I would .written prescription at the d

## 2020-08-03 NOTE — TELEPHONE ENCOUNTER
Dr Davonna Schirmer, please see pending rx SILDENAFIL, rx status is changed from electronic to print, please ask staff to call patient to  rx at [facility]  Please respond to pool: MARK TINAJERO LPN/JOSSELINE

## 2020-08-03 NOTE — TELEPHONE ENCOUNTER
Patient is requesting refill of medication Sildenafil Citrate (VIAGRA) 100 MG Oral Tab.  Patient states he no longer has refills left of this medication and is requesting a written script that he could  at the [address] and take to his pharmacy

## 2020-08-04 RX ORDER — SILDENAFIL 100 MG/1
100 TABLET, FILM COATED ORAL AS NEEDED
Qty: 30 TABLET | Refills: 1 | OUTPATIENT
Start: 2020-08-04

## 2020-09-09 ENCOUNTER — APPOINTMENT (OUTPATIENT)
Dept: OCCUPATIONAL MEDICINE | Age: 60
End: 2020-09-09
Attending: ORTHOPAEDIC SURGERY

## 2020-11-07 DIAGNOSIS — G44.011 INTRACTABLE EPISODIC CLUSTER HEADACHE: ICD-10-CM

## 2020-11-07 RX ORDER — SUMATRIPTAN 100 MG/1
TABLET, FILM COATED ORAL
Qty: 9 TABLET | Refills: 2 | Status: SHIPPED | OUTPATIENT
Start: 2020-11-07 | End: 2020-11-16

## 2020-11-09 ENCOUNTER — NURSE TRIAGE (OUTPATIENT)
Dept: INTERNAL MEDICINE CLINIC | Facility: CLINIC | Age: 60
End: 2020-11-09

## 2020-11-09 ENCOUNTER — APPOINTMENT (OUTPATIENT)
Dept: GENERAL RADIOLOGY | Facility: HOSPITAL | Age: 60
End: 2020-11-09
Attending: EMERGENCY MEDICINE
Payer: COMMERCIAL

## 2020-11-09 ENCOUNTER — HOSPITAL ENCOUNTER (EMERGENCY)
Facility: HOSPITAL | Age: 60
Discharge: HOME | End: 2020-11-09
Attending: EMERGENCY MEDICINE
Payer: COMMERCIAL

## 2020-11-09 VITALS
OXYGEN SATURATION: 95 % | HEART RATE: 82 BPM | BODY MASS INDEX: 29.8 KG/M2 | WEIGHT: 220 LBS | DIASTOLIC BLOOD PRESSURE: 85 MMHG | SYSTOLIC BLOOD PRESSURE: 117 MMHG | RESPIRATION RATE: 16 BRPM | HEIGHT: 72 IN | TEMPERATURE: 100 F

## 2020-11-09 DIAGNOSIS — S22.31XA CLOSED FRACTURE OF ONE RIB OF RIGHT SIDE, INITIAL ENCOUNTER: Primary | ICD-10-CM

## 2020-11-09 PROCEDURE — 96374 THER/PROPH/DIAG INJ IV PUSH: CPT

## 2020-11-09 PROCEDURE — 99284 EMERGENCY DEPT VISIT MOD MDM: CPT

## 2020-11-09 PROCEDURE — 85025 COMPLETE CBC W/AUTO DIFF WBC: CPT | Performed by: EMERGENCY MEDICINE

## 2020-11-09 PROCEDURE — 80048 BASIC METABOLIC PNL TOTAL CA: CPT | Performed by: EMERGENCY MEDICINE

## 2020-11-09 PROCEDURE — 81003 URINALYSIS AUTO W/O SCOPE: CPT | Performed by: EMERGENCY MEDICINE

## 2020-11-09 PROCEDURE — 71101 X-RAY EXAM UNILAT RIBS/CHEST: CPT | Performed by: EMERGENCY MEDICINE

## 2020-11-09 RX ORDER — KETOROLAC TROMETHAMINE 15 MG/ML
15 INJECTION, SOLUTION INTRAMUSCULAR; INTRAVENOUS ONCE
Status: COMPLETED | OUTPATIENT
Start: 2020-11-09 | End: 2020-11-09

## 2020-11-09 RX ORDER — HYDROCODONE BITARTRATE AND ACETAMINOPHEN 5; 325 MG/1; MG/1
1-2 TABLET ORAL EVERY 8 HOURS PRN
Qty: 15 TABLET | Refills: 0 | Status: SHIPPED | OUTPATIENT
Start: 2020-11-09 | End: 2020-11-16

## 2020-11-09 NOTE — ED INITIAL ASSESSMENT (HPI)
Pt reports right sided back pain. Pt states that he coughed earlier today and hear a pop.  Pt is not sure if he broke a rib or if passing a kidney stone

## 2020-11-09 NOTE — TELEPHONE ENCOUNTER
Please reply to pool: EM RN TRIAGE    Action Requested: Summary for Provider     []  Critical Lab, Recommendations Needed  [] Need Additional Advice  [x]   FYI    []   Need Orders  [] Need Medications Sent to Pharmacy  []  Other     SUMMARY: Strongly

## 2020-11-10 NOTE — TELEPHONE ENCOUNTER
Disposition       Discharge        ED/IC AVS (Printed 11/9/2020)        Follow-Ups: Schedule an appointment with Pam Duque MD (Internal Medicine) in 1 week (11/16/2020)    Appt 11/16/2020

## 2020-11-10 NOTE — ED PROVIDER NOTES
[place] for  Patient Seen in: [facility] AND [facility] Emergency Department      History   Patient presents with:  Abdomen/Flank Pain    Stated Complaint:     HPI    Patient is a 24-year-old male who presents with right flank/lower back pain that started 5 days ago 100.1 °F (37.8 °C) (Temporal)   Resp 16   Ht 182.9 cm (6')   Wt 99.8 kg   SpO2 97%   BMI 29.84 kg/m²         Physical Exam  Vitals signs and nursing note reviewed. Constitutional:       General: He is not in acute distress.      Appearance: He is well-dev limits   CBC WITH DIFFERENTIAL WITH PLATELET    Narrative: The following orders were created for panel order CBC WITH DIFFERENTIAL WITH PLATELET.   Procedure                               Abnormality         Status                     ---------

## 2020-11-10 NOTE — ED NOTES
Patient cleared for discharge by MD. Patient has incentive spirometer to go home with. Patient verbalizes understanding of discharge instructions and prescriptions.

## 2020-11-16 ENCOUNTER — OFFICE VISIT (OUTPATIENT)
Dept: INTERNAL MEDICINE CLINIC | Facility: CLINIC | Age: 60
End: 2020-11-16
Payer: COMMERCIAL

## 2020-11-16 VITALS
HEIGHT: 72 IN | DIASTOLIC BLOOD PRESSURE: 75 MMHG | SYSTOLIC BLOOD PRESSURE: 124 MMHG | WEIGHT: 225.81 LBS | BODY MASS INDEX: 30.59 KG/M2 | TEMPERATURE: 98 F | HEART RATE: 71 BPM

## 2020-11-16 DIAGNOSIS — G44.011 INTRACTABLE EPISODIC CLUSTER HEADACHE: ICD-10-CM

## 2020-11-16 DIAGNOSIS — Z12.11 COLON CANCER SCREENING: ICD-10-CM

## 2020-11-16 DIAGNOSIS — K63.5 POLYP OF COLON, UNSPECIFIED PART OF COLON, UNSPECIFIED TYPE: ICD-10-CM

## 2020-11-16 DIAGNOSIS — Z00.00 ANNUAL PHYSICAL EXAM: Primary | ICD-10-CM

## 2020-11-16 DIAGNOSIS — Z15.09 BRCA1 GENETIC CARRIER: ICD-10-CM

## 2020-11-16 DIAGNOSIS — Z15.01 BRCA1 GENETIC CARRIER: ICD-10-CM

## 2020-11-16 PROCEDURE — 99396 PREV VISIT EST AGE 40-64: CPT | Performed by: INTERNAL MEDICINE

## 2020-11-16 PROCEDURE — 3008F BODY MASS INDEX DOCD: CPT | Performed by: INTERNAL MEDICINE

## 2020-11-16 PROCEDURE — 3078F DIAST BP <80 MM HG: CPT | Performed by: INTERNAL MEDICINE

## 2020-11-16 PROCEDURE — 3074F SYST BP LT 130 MM HG: CPT | Performed by: INTERNAL MEDICINE

## 2020-11-16 RX ORDER — PREDNISONE 10 MG/1
TABLET ORAL
Qty: 60 TABLET | Refills: 0 | Status: SHIPPED | OUTPATIENT
Start: 2020-11-16 | End: 2021-05-22

## 2020-11-16 RX ORDER — SUMATRIPTAN 100 MG/1
100 TABLET, FILM COATED ORAL AS NEEDED
Qty: 9 TABLET | Refills: 2 | Status: SHIPPED | OUTPATIENT
Start: 2020-11-16 | End: 2020-12-08

## 2020-11-16 NOTE — PROGRESS NOTES
History of Present Illness   Patient ID: Anam Paez is a 63year old male.   Chief Complaint: Physical (11/9 R rib fracture noticed from coughing)      Anam Paez is a pleasant 63year old male who presents for annual physical exam. Anam Paez Negative for pain and visual disturbance. Respiratory: Negative for cough, shortness of breath and wheezing. Cardiovascular: Negative for chest pain, palpitations and leg swelling.    Gastrointestinal: Negative for abdominal pain, blood in stool and an 1.2 11/16/2019    TP 7.3 11/16/2019    ALB 4.0 11/16/2019    GLOBULIN 3.3 11/16/2019    AGRATIO 1.4 11/21/2015     11/09/2020    K 4.0 11/09/2020     11/09/2020    CO2 23.0 11/09/2020     Lab Results   Component Value Date     11/16/2019 • CATARACT EXTRACTION W/  INTRAOCULAR LENS IMPLANT Bilateral    • COLONOSCOPY  04/17/2012   • COLONOSCOPY & POLYPECTOMY  07/25/2018    Dr. Frankie Lundberg.    • EGD  07/2018   • INGUINAL HERNIA REPAIR Right 1980   • INGUINAL HERNIA REPAIR Left 2003   • VASECTOM Dispense: 9 tablet; Refill: 2  - predniSONE 10 MG Oral Tab; Take 30mg daily for 5 days when your cluster headaches start, then 20mg on day 6, then 10mg on day 7, then stop. Dispense: 60 tablet; Refill: 0  Plan  Chronic. Stable. Continue as above  3.  Col advice. ----------------------------------------- PATIENT INSTRUCTIONS-----------------------------------------     There are no Patient Instructions on file for this visit.

## 2020-11-30 ENCOUNTER — TELEPHONE (OUTPATIENT)
Dept: INTERNAL MEDICINE CLINIC | Facility: CLINIC | Age: 60
End: 2020-11-30

## 2020-11-30 NOTE — TELEPHONE ENCOUNTER
[facility] [address] [place] [address], [phone], [phone]      Disp Refills Start End    SUMAtriptan Succinate 100 MG Oral Tab 9 tablet 2 11/16/2020     Sig - Route:  Take

## 2020-11-30 NOTE — TELEPHONE ENCOUNTER
Per pharmacy pt is requesting refill for the following medication. •  SUMAtriptan Succinate 100 MG Oral Tab, Take 1 tablet (100 mg total) by mouth as needed for Migraine. MAY REPEAT DOSAGE IN 2 HOURS.  MAX OF 2 TABLETS IN 24 HOURS, Disp: 9 tablet, Rfl:

## 2020-12-08 DIAGNOSIS — G44.011 INTRACTABLE EPISODIC CLUSTER HEADACHE: ICD-10-CM

## 2020-12-08 RX ORDER — SUMATRIPTAN 100 MG/1
100 TABLET, FILM COATED ORAL AS NEEDED
Qty: 9 TABLET | Refills: 2 | Status: SHIPPED | OUTPATIENT
Start: 2020-12-08 | End: 2021-05-22

## 2020-12-08 NOTE — TELEPHONE ENCOUNTER
Per pharmacy pt is requesting refill for the following medication. SUMAtriptan Succinate 100 MG Oral Tab, Take 1 tablet (100 mg total) by mouth as needed for Migraine. MAY REPEAT DOSAGE IN 2 HOURS.  MAX OF 2 TABLETS IN 24 HOURS, Disp: 9 tablet, Rfl: 2

## 2020-12-10 ENCOUNTER — TELEPHONE (OUTPATIENT)
Dept: INTERNAL MEDICINE CLINIC | Facility: CLINIC | Age: 60
End: 2020-12-10

## 2020-12-10 NOTE — TELEPHONE ENCOUNTER
Verified name and . Patient calling requesting test results for lipid panel drawn on 20.  Patient was informed of results as requested along with Dr. Kay Roman message:       Sara Welch MD   2020  9:10 PM      Please schedule follow up carol

## 2021-05-05 ENCOUNTER — OFFICE VISIT (OUTPATIENT)
Dept: INTERNAL MEDICINE CLINIC | Facility: CLINIC | Age: 61
End: 2021-05-05
Payer: COMMERCIAL

## 2021-05-05 VITALS — WEIGHT: 220 LBS | BODY MASS INDEX: 29.8 KG/M2 | HEIGHT: 72 IN

## 2021-05-05 DIAGNOSIS — Z13.828 SCOLIOSIS CONCERN: ICD-10-CM

## 2021-05-05 DIAGNOSIS — M62.830 MUSCLE SPASM OF BACK: Primary | ICD-10-CM

## 2021-05-05 DIAGNOSIS — E78.2 MIXED HYPERLIPIDEMIA: ICD-10-CM

## 2021-05-05 DIAGNOSIS — R10.9 FLANK PAIN: ICD-10-CM

## 2021-05-05 PROCEDURE — 3008F BODY MASS INDEX DOCD: CPT | Performed by: INTERNAL MEDICINE

## 2021-05-05 PROCEDURE — 99214 OFFICE O/P EST MOD 30 MIN: CPT | Performed by: INTERNAL MEDICINE

## 2021-05-05 RX ORDER — CYCLOBENZAPRINE HCL 5 MG
TABLET ORAL
Qty: 30 TABLET | Refills: 1 | Status: SHIPPED | OUTPATIENT
Start: 2021-05-05 | End: 2021-06-15

## 2021-05-05 RX ORDER — ROSUVASTATIN CALCIUM 10 MG/1
10 TABLET, COATED ORAL NIGHTLY
Qty: 90 TABLET | Refills: 1 | Status: SHIPPED | OUTPATIENT
Start: 2021-05-05

## 2021-05-05 NOTE — PROGRESS NOTES
History of Present Illness   Patient ID: Erlin Pearson is a 57year old male. Chief Complaint: No chief complaint on file. HPI   Pleasant 79-year-old gentleman who presents to discuss  1.   He has been having bilateral lower back pain since Monday m Rhythm: Normal rate. Heart sounds: Normal heart sounds. Pulmonary:      Effort: Pulmonary effort is normal.   Abdominal:      General: Abdomen is flat. Bowel sounds are normal.      Palpations: Abdomen is soft. Tenderness:  There is no abdominal above musculoskeletal issue is resolved, will have him follow-up about 2 months after initiation of rosuvastatin. Follow Up:   Return in 2 months (on 7/5/2021), or if symptoms worsen or fail to improve, for Cholesterol.         [address] hyperlipidemia      Dyslipidemia      Other male erectile dysfunction      Polyp of colon      Screening for prostate cancer      Intractable episodic cluster headache      Gastroesophageal reflux disease       Reviewed Social History:  Social History    T INSTRUCTIONS-----------------------------------------     There are no Patient Instructions on file for this visit.

## 2021-05-18 ENCOUNTER — APPOINTMENT (OUTPATIENT)
Dept: GENERAL RADIOLOGY | Age: 61
End: 2021-05-18
Attending: INTERNAL MEDICINE
Payer: COMMERCIAL

## 2021-05-18 ENCOUNTER — HOSPITAL ENCOUNTER (OUTPATIENT)
Dept: GENERAL RADIOLOGY | Age: 61
End: 2021-05-18
Attending: INTERNAL MEDICINE
Payer: COMMERCIAL

## 2021-05-18 ENCOUNTER — HOSPITAL ENCOUNTER (OUTPATIENT)
Dept: GENERAL RADIOLOGY | Facility: HOSPITAL | Age: 61
Discharge: HOME | End: 2021-05-18
Attending: INTERNAL MEDICINE
Payer: COMMERCIAL

## 2021-05-18 DIAGNOSIS — Z13.828 SCOLIOSIS CONCERN: ICD-10-CM

## 2021-05-18 PROCEDURE — 72082 X-RAY EXAM ENTIRE SPI 2/3 VW: CPT | Performed by: INTERNAL MEDICINE

## 2021-05-21 ENCOUNTER — TELEPHONE (OUTPATIENT)
Dept: INTERNAL MEDICINE CLINIC | Facility: CLINIC | Age: 61
End: 2021-05-21

## 2021-05-21 DIAGNOSIS — G44.011 INTRACTABLE EPISODIC CLUSTER HEADACHE: ICD-10-CM

## 2021-05-21 DIAGNOSIS — S22.060A COMPRESSION FRACTURE OF T8 VERTEBRA, INITIAL ENCOUNTER (HCC): Primary | ICD-10-CM

## 2021-05-21 NOTE — TELEPHONE ENCOUNTER
Patient called stating he saw his xray results. Patient advised that Mouna Mathur has not yet reviewed the x-ray. Patient states he is still having back pain.      Patient also states he is having cluster headaches again and is requesting refills for [employer]

## 2021-05-22 RX ORDER — PREDNISONE 10 MG/1
TABLET ORAL
Qty: 60 TABLET | Refills: 0 | Status: SHIPPED | OUTPATIENT
Start: 2021-05-22

## 2021-05-22 RX ORDER — SUMATRIPTAN 100 MG/1
100 TABLET, FILM COATED ORAL AS NEEDED
Qty: 9 TABLET | Refills: 2 | Status: SHIPPED | OUTPATIENT
Start: 2021-05-22

## 2021-05-22 NOTE — TELEPHONE ENCOUNTER
1.  His x-ray shows minimal scoliosis and a mild compression fracture of his thoracic spine which is likely a chronic fractur but this is new over the past 6 years.   I would like for him to follow-up with the next available orthopedic surgeon to discuss fu

## 2021-06-15 DIAGNOSIS — M62.830 MUSCLE SPASM OF BACK: ICD-10-CM

## 2021-06-15 RX ORDER — CYCLOBENZAPRINE HCL 5 MG
TABLET ORAL
Qty: 30 TABLET | Refills: 1 | Status: SHIPPED | OUTPATIENT
Start: 2021-06-15

## 2021-06-15 NOTE — TELEPHONE ENCOUNTER
Patient seen in the office on 5/5/21 for Muscle spasm to the back. He is asking for a refill on the cyclobenzaprine. Last ordered for 30 tabs and one refill on 5/5/21. Per his wife Sherri Melvin he has gotten the one refill already but is still having back pain.  A

## 2021-07-03 ENCOUNTER — MED REC SCAN ONLY (OUTPATIENT)
Dept: INTERNAL MEDICINE CLINIC | Facility: CLINIC | Age: 61
End: 2021-07-03

## 2021-07-07 ENCOUNTER — TELEPHONE (OUTPATIENT)
Dept: INTERNAL MEDICINE CLINIC | Facility: CLINIC | Age: 61
End: 2021-07-07

## 2021-07-07 NOTE — TELEPHONE ENCOUNTER
[address] [place] checking status on the orders and plan of care and physician to see if Dr has signed the orders,  please advise

## 2021-07-07 NOTE — TELEPHONE ENCOUNTER
1st call left message to patient voice mail and also left message to [address] [place] that patient needs an appointment and understood.

## 2021-07-09 NOTE — TELEPHONE ENCOUNTER
Orders #[id number], #[id number], #[id number] have been signed and faxed to [facility] at [phone]. Fax confirmation received and copy sent to scan      pt still needing hfu appt.  If patient calls back please sched appt

## 2021-07-09 NOTE — TELEPHONE ENCOUNTER
2nd attempt/left voice mail message for pt to call back. When the patient returns the call please see message below and help assist in scheduling an appointment.

## 2021-07-16 ENCOUNTER — TELEPHONE (OUTPATIENT)
Dept: INTERNAL MEDICINE CLINIC | Facility: CLINIC | Age: 61
End: 2021-07-16

## 2021-07-16 NOTE — TELEPHONE ENCOUNTER
Orders #[id number], #[id number], [id number], [id number], [id number], #[id number] have been signed and faxed to [facility] [address] at [phone].    Fax confirmation received and copy sent to [facility]

## 2021-07-21 ENCOUNTER — HOSPITAL ENCOUNTER (OUTPATIENT)
Age: 61
Discharge: HOME | End: 2021-07-21
Payer: COMMERCIAL

## 2021-07-21 VITALS
HEART RATE: 92 BPM | OXYGEN SATURATION: 97 % | DIASTOLIC BLOOD PRESSURE: 62 MMHG | SYSTOLIC BLOOD PRESSURE: 107 MMHG | RESPIRATION RATE: 18 BRPM | TEMPERATURE: 98 F

## 2021-07-21 DIAGNOSIS — R30.0 DYSURIA: Primary | ICD-10-CM

## 2021-07-21 LAB
BILIRUB UR QL STRIP: NEGATIVE
CLARITY UR: CLEAR
COLOR UR: YELLOW
GLUCOSE UR STRIP-MCNC: NEGATIVE MG/DL
HGB UR QL STRIP: NEGATIVE
LEUKOCYTE ESTERASE UR QL STRIP: NEGATIVE
NITRITE UR QL STRIP: NEGATIVE
PH UR STRIP: 5.5 [PH]
PROT UR STRIP-MCNC: NEGATIVE MG/DL
SP GR UR STRIP: 1.02
UROBILINOGEN UR STRIP-ACNC: <2 MG/DL

## 2021-07-21 PROCEDURE — 81002 URINALYSIS NONAUTO W/O SCOPE: CPT | Performed by: NURSE PRACTITIONER

## 2021-07-21 PROCEDURE — 99203 OFFICE O/P NEW LOW 30 MIN: CPT | Performed by: NURSE PRACTITIONER

## 2021-07-22 NOTE — ED PROVIDER NOTES
Patient Seen in: [facility]      History   Patient presents with:  Urinary Symptoms    Stated Complaint: UTI    HPI/Subjective:   Patient presents to the immediate care accompanied by self. Patient reports dysuria that started last night.   P for dizziness and headaches. Positive for stated complaint: UTI  Other systems are as noted in HPI. Constitutional and vital signs reviewed. All other systems reviewed and negative except as noted above.     Physical Exam     ED Triage Vitals [0 Mood and Affect: Mood normal.         Behavior: Behavior normal.             ED Course     Labs Reviewed   [facility] POCT URINALYSIS DIPSTICK - Abnormal; Notable for the following components:       Result Value    Ketone, Urine Trace (*)     All other com primary care physician, call for an appointment. Patient understood instructions. Patient feels safe to go home.                        Disposition and Plan     Clinical Impression:  Dysuria  (primary encounter diagnosis)     Disposition:  Discharge  7/21

## 2021-07-22 NOTE — ED INITIAL ASSESSMENT (HPI)
Pt came in due to burning with urination for the past 2 days. Pt stated he has history of CA with treatment currently. Pt denies nay pain. Pt denies any fever, chills, or body aches. Pt is fully verbal and ambulatory with walker.  Pt has easy non labored re

## 2021-07-31 NOTE — TELEPHONE ENCOUNTER
ORDER #[id number] SIGNED BY DR. SMALLS AND FAXED BACK TO [facility] -[phone].  FAX CONFIRMATION RECEIVED AND SENT TO [facility]

## 2021-08-03 ENCOUNTER — TELEPHONE (OUTPATIENT)
Dept: INTERNAL MEDICINE CLINIC | Facility: CLINIC | Age: 61
End: 2021-08-03

## 2021-08-03 NOTE — TELEPHONE ENCOUNTER
[address] checking on form for DME equipment that needs to be signed by Dr as soon as possible, please advise

## 2021-08-03 NOTE — TELEPHONE ENCOUNTER
Spoke with Ronny Troncoso, clarified this is for \"Rollator with seat\".  [id number] signed and faxed 7/9/21 and faxed back to [address] is now requesting that Dr. Rajendra Black signs a requisition with order number and dx to bill insurance that DME facility shoul

## 2021-08-04 ENCOUNTER — MED REC SCAN ONLY (OUTPATIENT)
Dept: INTERNAL MEDICINE CLINIC | Facility: CLINIC | Age: 61
End: 2021-08-04

## 2021-08-04 NOTE — TELEPHONE ENCOUNTER
Received rollator w seat order form from [facility]  Signed and faxed to [phone]  Fax confirmation received  Copy sent scan

## 2021-08-18 ENCOUNTER — TELEPHONE (OUTPATIENT)
Dept: INTERNAL MEDICINE CLINIC | Facility: CLINIC | Age: 61
End: 2021-08-18

## 2021-08-18 NOTE — TELEPHONE ENCOUNTER
Frankie Crain, would like to inform the doctor that the patient rescheduled resumption of care home health services for Friday 8-20-21.

## 2021-09-02 ENCOUNTER — MED REC SCAN ONLY (OUTPATIENT)
Dept: INTERNAL MEDICINE CLINIC | Facility: CLINIC | Age: 61
End: 2021-09-02

## 2021-10-01 ENCOUNTER — MED REC SCAN ONLY (OUTPATIENT)
Dept: INTERNAL MEDICINE CLINIC | Facility: CLINIC | Age: 61
End: 2021-10-01

## 2022-03-09 ENCOUNTER — APPOINTMENT (OUTPATIENT)
Dept: CT IMAGING | Facility: HOSPITAL | Age: 62
End: 2022-03-09
Attending: EMERGENCY MEDICINE
Payer: COMMERCIAL

## 2022-03-09 ENCOUNTER — APPOINTMENT (OUTPATIENT)
Dept: GENERAL RADIOLOGY | Facility: HOSPITAL | Age: 62
End: 2022-03-09
Attending: EMERGENCY MEDICINE
Payer: COMMERCIAL

## 2022-03-09 ENCOUNTER — HOSPITAL ENCOUNTER (EMERGENCY)
Facility: HOSPITAL | Age: 62
Discharge: HOME | End: 2022-03-09
Attending: EMERGENCY MEDICINE
Payer: COMMERCIAL

## 2022-03-09 VITALS
HEART RATE: 86 BPM | BODY MASS INDEX: 23.62 KG/M2 | DIASTOLIC BLOOD PRESSURE: 65 MMHG | OXYGEN SATURATION: 95 % | WEIGHT: 165 LBS | SYSTOLIC BLOOD PRESSURE: 101 MMHG | TEMPERATURE: 97 F | HEIGHT: 70 IN | RESPIRATION RATE: 18 BRPM

## 2022-03-09 DIAGNOSIS — S20.211A CONTUSION OF RIGHT CHEST WALL, INITIAL ENCOUNTER: ICD-10-CM

## 2022-03-09 DIAGNOSIS — S09.8XXA BLUNT HEAD TRAUMA, INITIAL ENCOUNTER: Primary | ICD-10-CM

## 2022-03-09 PROCEDURE — 70450 CT HEAD/BRAIN W/O DYE: CPT | Performed by: EMERGENCY MEDICINE

## 2022-03-09 PROCEDURE — 71101 X-RAY EXAM UNILAT RIBS/CHEST: CPT | Performed by: EMERGENCY MEDICINE

## 2022-03-09 PROCEDURE — 99284 EMERGENCY DEPT VISIT MOD MDM: CPT

## 2022-03-09 NOTE — ED INITIAL ASSESSMENT (HPI)
Pt to ED after a fall. He states he was walking his dog and fell onto the concrete sidewalk after his dog ran after a squirrel. Leticia Sickle onto his right side. No LOC. C/O right flank pain. Abrasions present to face and hand. Takes Xarelto. Outpatient surgery 2 weeks ago due to fractures in his spine due to multiple myeloma. Stem cell transplant 3 months ago.

## 2022-03-09 NOTE — ED QUICK NOTES
Patient received discharge instructions with follow-up medications and verbalized understanding. Patient ambulated out of ER in stable condition in no apparent distress.

## 2022-04-14 ENCOUNTER — MED REC SCAN ONLY (OUTPATIENT)
Dept: INTERNAL MEDICINE CLINIC | Facility: CLINIC | Age: 62
End: 2022-04-14

## 2022-07-07 ENCOUNTER — MED REC SCAN ONLY (OUTPATIENT)
Dept: INTERNAL MEDICINE CLINIC | Facility: CLINIC | Age: 62
End: 2022-07-07

## 2022-12-15 ENCOUNTER — MED REC SCAN ONLY (OUTPATIENT)
Dept: INTERNAL MEDICINE CLINIC | Facility: CLINIC | Age: 62
End: 2022-12-15

## 2023-05-09 ENCOUNTER — OFFICE VISIT (OUTPATIENT)
Dept: INTERNAL MEDICINE CLINIC | Facility: CLINIC | Age: 63
End: 2023-05-09

## 2023-05-09 ENCOUNTER — HOSPITAL ENCOUNTER (OUTPATIENT)
Dept: GENERAL RADIOLOGY | Age: 63
Discharge: HOME | End: 2023-05-09
Attending: NURSE PRACTITIONER
Payer: COMMERCIAL

## 2023-05-09 VITALS
DIASTOLIC BLOOD PRESSURE: 69 MMHG | HEIGHT: 70 IN | SYSTOLIC BLOOD PRESSURE: 112 MMHG | HEART RATE: 73 BPM | BODY MASS INDEX: 23.77 KG/M2 | OXYGEN SATURATION: 98 % | WEIGHT: 166 LBS

## 2023-05-09 DIAGNOSIS — R05.9 COUGH, UNSPECIFIED TYPE: ICD-10-CM

## 2023-05-09 DIAGNOSIS — R05.9 COUGH, UNSPECIFIED TYPE: Primary | ICD-10-CM

## 2023-05-09 PROCEDURE — 71046 X-RAY EXAM CHEST 2 VIEWS: CPT | Performed by: NURSE PRACTITIONER

## 2023-05-09 PROCEDURE — 3074F SYST BP LT 130 MM HG: CPT | Performed by: NURSE PRACTITIONER

## 2023-05-09 PROCEDURE — 3078F DIAST BP <80 MM HG: CPT | Performed by: NURSE PRACTITIONER

## 2023-05-09 PROCEDURE — 3008F BODY MASS INDEX DOCD: CPT | Performed by: NURSE PRACTITIONER

## 2023-05-09 PROCEDURE — 99213 OFFICE O/P EST LOW 20 MIN: CPT | Performed by: NURSE PRACTITIONER

## 2023-05-09 RX ORDER — ACYCLOVIR 400 MG/1
TABLET ORAL
COMMUNITY
Start: 2023-03-02

## 2023-05-09 RX ORDER — DULOXETIN HYDROCHLORIDE 60 MG/1
CAPSULE, DELAYED RELEASE ORAL
COMMUNITY
Start: 2023-04-18

## 2023-05-09 RX ORDER — GABAPENTIN 300 MG/1
CAPSULE ORAL
COMMUNITY
Start: 2023-04-10

## 2023-05-09 RX ORDER — FAMOTIDINE 20 MG/1
20 TABLET, FILM COATED ORAL 2 TIMES DAILY
COMMUNITY

## 2023-05-09 RX ORDER — LENALIDOMIDE 10 MG/1
10 CAPSULE ORAL DAILY
COMMUNITY
Start: 2023-04-25

## 2023-05-09 RX ORDER — BENZONATATE 200 MG/1
200 CAPSULE ORAL 3 TIMES DAILY PRN
Qty: 30 CAPSULE | Refills: 0 | Status: SHIPPED | OUTPATIENT
Start: 2023-05-09

## 2023-11-14 ENCOUNTER — MED REC SCAN ONLY (OUTPATIENT)
Dept: INTERNAL MEDICINE CLINIC | Facility: CLINIC | Age: 63
End: 2023-11-14

## 2024-09-24 ENCOUNTER — MED REC SCAN ONLY (OUTPATIENT)
Facility: LOCATION | Age: 64
End: 2024-09-24

## (undated) NOTE — LETTER
8/28/2018          To Whom It May Concern:    Basia Christianson is currently under my medical care and may not  return to work until further notice. If you require additional information please contact our office.         Sincerely,        ALBERT De Santiago

## (undated) NOTE — LETTER
9/14/2017              [address] [place] [address],[address] [facility] [address] [place] [address]         To Whom It May Concern,    Aditya Rao is a  Patient under my care.   He has a history of cluster headaches for which he used to take

## (undated) NOTE — LETTER
8/28/2018          To Whom It May Concern:    Alexandr Pradhan is currently under my medical care and may not  return to work until further notice. He will be reevaluated on Sept. 11th. If you require additional information please contact our office.

## (undated) NOTE — LETTER
8/14/2018          To Whom It May Concern:    Jose Grayson is currently under my medical care and may not return to work at this time. He was seen in the office on 08/14/2018 and his work status will be reassessed on 08/28/2018.     If you require add

## (undated) NOTE — LETTER
9/11/2018          To Whom It May Concern:    Zoltan Valdes is currently under my medical care and may return to work on Monday, September 17, 2018. Activity is restricted as follows: No restrictions.     If you require additional information please co

## (undated) NOTE — LETTER
12/02/21        [address] [facility] [id number]      Dear Charly Godwin records indicate that you have outstanding lab work and or testing that was ordered for you and has not yet been completed:  Orders Pl

## (undated) NOTE — LETTER
Date & Time: 8/9/2018, 8:30 PM  Patient: Vidhya Co  Encounter Provider(s):    Amelia Dowd MD       To Whom It May Concern:    Pascale Gregorio was seen and treated in our department on 8/9/2018.  He cannot work until he is evaluated by the general surge

## (undated) NOTE — LETTER
01/16/20        [address]      Dear Gwen Lundborg records indicate that you have outstanding lab work and or testing that was ordered for you and has not yet been completed:  Orders Placed

## (undated) NOTE — LETTER
No referring provider defined for this encounter. 08/14/18        Patient: Donna Wilkerson   YOB: 1960   Date of Visit: 8/14/2018       Dear  Dr. Imer Merrill MD,      Thank you for referring Donna Wilkerson to my practice.   Please find m

## (undated) NOTE — ED AVS SNAPSHOT
Donna Wilkerson   MRN: [id number]    Department:  [facility] Emergency Department   Date of Visit:  8/9/2018           Disclosure     Insurance plans vary and the physician(s) referred by the ER may not be covered by your plan.  Please contact y CARE PHYSICIAN AT ONCE OR RETURN IMMEDIATELY TO THE EMERGENCY DEPARTMENT. If you have been prescribed any medication(s), please fill your prescription right away and begin taking the medication(s) as directed.   If you believe that any of the medications